# Patient Record
Sex: FEMALE | Race: OTHER | Employment: FULL TIME | URBAN - METROPOLITAN AREA
[De-identification: names, ages, dates, MRNs, and addresses within clinical notes are randomized per-mention and may not be internally consistent; named-entity substitution may affect disease eponyms.]

---

## 2020-10-05 ENCOUNTER — APPOINTMENT (OUTPATIENT)
Dept: ULTRASOUND IMAGING | Age: 30
End: 2020-10-05

## 2020-10-05 ENCOUNTER — HOSPITAL ENCOUNTER (EMERGENCY)
Age: 30
Discharge: HOME | End: 2020-10-05
Attending: EMERGENCY MEDICINE

## 2020-10-05 VITALS
HEART RATE: 72 BPM | HEIGHT: 60 IN | TEMPERATURE: 98.4 F | BODY MASS INDEX: 23.56 KG/M2 | RESPIRATION RATE: 16 BRPM | DIASTOLIC BLOOD PRESSURE: 56 MMHG | SYSTOLIC BLOOD PRESSURE: 101 MMHG | WEIGHT: 120 LBS | OXYGEN SATURATION: 99 %

## 2020-10-05 LAB
ABO/RH: NORMAL
ALBUMIN SERPL-MCNC: 4.2 G/DL (ref 3.4–5)
ALP BLD-CCNC: 70 U/L (ref 40–129)
ALT SERPL-CCNC: 11 U/L (ref 10–40)
ANION GAP SERPL CALCULATED.3IONS-SCNC: 10 MMOL/L (ref 3–16)
AST SERPL-CCNC: 18 U/L (ref 15–37)
BACTERIA: ABNORMAL /HPF
BASOPHILS ABSOLUTE: 0 K/UL (ref 0–0.2)
BASOPHILS RELATIVE PERCENT: 0.4 %
BILIRUB SERPL-MCNC: 0.4 MG/DL (ref 0–1)
BILIRUBIN DIRECT: <0.2 MG/DL (ref 0–0.3)
BILIRUBIN URINE: ABNORMAL
BILIRUBIN, INDIRECT: NORMAL MG/DL (ref 0–1)
BLOOD, URINE: ABNORMAL
BUN BLDV-MCNC: 8 MG/DL (ref 7–20)
CALCIUM SERPL-MCNC: 9.1 MG/DL (ref 8.3–10.6)
CHLORIDE BLD-SCNC: 106 MMOL/L (ref 99–110)
CLARITY: ABNORMAL
CO2: 24 MMOL/L (ref 21–32)
COLOR: ABNORMAL
CREAT SERPL-MCNC: <0.5 MG/DL (ref 0.6–1.1)
EOSINOPHILS ABSOLUTE: 0.1 K/UL (ref 0–0.6)
EOSINOPHILS RELATIVE PERCENT: 0.5 %
EPITHELIAL CELLS, UA: ABNORMAL /HPF (ref 0–5)
GFR AFRICAN AMERICAN: >60
GFR NON-AFRICAN AMERICAN: >60
GLUCOSE BLD-MCNC: 136 MG/DL (ref 70–99)
GLUCOSE URINE: NEGATIVE MG/DL
GONADOTROPIN, CHORIONIC (HCG) QUANT: 813.3 MIU/ML
HCT VFR BLD CALC: 39.7 % (ref 36–48)
HEMOGLOBIN: 13.6 G/DL (ref 12–16)
KETONES, URINE: NEGATIVE MG/DL
LEUKOCYTE ESTERASE, URINE: NEGATIVE
LIPASE: 27 U/L (ref 13–60)
LYMPHOCYTES ABSOLUTE: 1.5 K/UL (ref 1–5.1)
LYMPHOCYTES RELATIVE PERCENT: 15.4 %
MCH RBC QN AUTO: 33 PG (ref 26–34)
MCHC RBC AUTO-ENTMCNC: 34.2 G/DL (ref 31–36)
MCV RBC AUTO: 96.6 FL (ref 80–100)
MICROSCOPIC EXAMINATION: YES
MONOCYTES ABSOLUTE: 0.6 K/UL (ref 0–1.3)
MONOCYTES RELATIVE PERCENT: 5.9 %
NEUTROPHILS ABSOLUTE: 7.8 K/UL (ref 1.7–7.7)
NEUTROPHILS RELATIVE PERCENT: 77.8 %
NITRITE, URINE: NEGATIVE
PDW BLD-RTO: 13.5 % (ref 12.4–15.4)
PH UA: 5.5 (ref 5–8)
PLATELET # BLD: 245 K/UL (ref 135–450)
PMV BLD AUTO: 8.4 FL (ref 5–10.5)
POTASSIUM SERPL-SCNC: 4 MMOL/L (ref 3.5–5.1)
PROTEIN UA: 100 MG/DL
RBC # BLD: 4.11 M/UL (ref 4–5.2)
RBC UA: >100 /HPF (ref 0–4)
SODIUM BLD-SCNC: 140 MMOL/L (ref 136–145)
SPECIFIC GRAVITY UA: >=1.03 (ref 1–1.03)
TOTAL PROTEIN: 7.2 G/DL (ref 6.4–8.2)
URINE REFLEX TO CULTURE: ABNORMAL
URINE TYPE: ABNORMAL
UROBILINOGEN, URINE: 0.2 E.U./DL
WBC # BLD: 10 K/UL (ref 4–11)
WBC UA: ABNORMAL /HPF (ref 0–5)

## 2020-10-05 PROCEDURE — 6370000000 HC RX 637 (ALT 250 FOR IP): Performed by: PHYSICIAN ASSISTANT

## 2020-10-05 PROCEDURE — 86900 BLOOD TYPING SEROLOGIC ABO: CPT

## 2020-10-05 PROCEDURE — 99284 EMERGENCY DEPT VISIT MOD MDM: CPT

## 2020-10-05 PROCEDURE — 80076 HEPATIC FUNCTION PANEL: CPT

## 2020-10-05 PROCEDURE — 84702 CHORIONIC GONADOTROPIN TEST: CPT

## 2020-10-05 PROCEDURE — 76817 TRANSVAGINAL US OBSTETRIC: CPT

## 2020-10-05 PROCEDURE — 86901 BLOOD TYPING SEROLOGIC RH(D): CPT

## 2020-10-05 PROCEDURE — 83690 ASSAY OF LIPASE: CPT

## 2020-10-05 PROCEDURE — 81001 URINALYSIS AUTO W/SCOPE: CPT

## 2020-10-05 PROCEDURE — 85025 COMPLETE CBC W/AUTO DIFF WBC: CPT

## 2020-10-05 PROCEDURE — 80048 BASIC METABOLIC PNL TOTAL CA: CPT

## 2020-10-05 PROCEDURE — 2580000003 HC RX 258: Performed by: PHYSICIAN ASSISTANT

## 2020-10-05 RX ORDER — 0.9 % SODIUM CHLORIDE 0.9 %
1000 INTRAVENOUS SOLUTION INTRAVENOUS ONCE
Status: COMPLETED | OUTPATIENT
Start: 2020-10-05 | End: 2020-10-05

## 2020-10-05 RX ORDER — ACETAMINOPHEN 500 MG
1000 TABLET ORAL ONCE
Status: COMPLETED | OUTPATIENT
Start: 2020-10-05 | End: 2020-10-05

## 2020-10-05 RX ADMIN — ACETAMINOPHEN 1000 MG: 500 TABLET, FILM COATED ORAL at 19:25

## 2020-10-05 RX ADMIN — SODIUM CHLORIDE 1000 ML: 9 INJECTION, SOLUTION INTRAVENOUS at 19:25

## 2020-10-05 ASSESSMENT — PAIN SCALES - GENERAL: PAINLEVEL_OUTOF10: 8

## 2020-10-05 NOTE — ED PROVIDER NOTES
[facility]        Pt Name: Manuelito Lomeli  MRN: [id number]  [place] 1990  Date of evaluation: 10/5/2020  Provider: Carlos Verduzco PA-C  PCP: No primary care provider on file. I have seen and evaluated this patient with my supervising physician Toni Garcia. CHIEF COMPLAINT       Chief Complaint   Patient presents with    Vaginal Bleeding     began two hours ago- abd pain/ cramping as well- approx 4-5 weeks pregnant- no OB yet       HISTORY OF PRESENT ILLNESS   (Location, Timing/Onset, Context/Setting, Quality, Duration, Modifying Factors, Severity, Associated Signs and Symptoms)  Note limiting factors. Manuelito Lomeli is a 31 y.o. female that presents to the emergency department with a chief complaint of some lower abdominal and pelvic pain with some vaginal bleeding that began this morning. Her last normal menstrual cycle was on August 17. States she had a positive pregnancy test 2 weeks ago. She is G2, P1. She is yet to follow-up with OB and has not been taking prenatal vitamins. Denies any history of abdominal surgeries. Denies nausea, vomiting, fevers, chest pain, shortness of breath, dysuria, hematuria, diarrhea, bloody stool or any other symptoms. Rates her pain 8 out of 10. Language line [id number] was used to obtain history. Nursing Notes were all reviewed and agreed with or any disagreements were addressed in the HPI. REVIEW OF SYSTEMS    (2-9 systems for level 4, 10 or more for level 5)     Review of Systems    Positives and Pertinent negatives as per HPI. Except as noted above in the ROS, all other systems were reviewed and negative. PAST MEDICAL HISTORY   History reviewed. No pertinent past medical history. SURGICAL HISTORY   History reviewed. No pertinent surgical history.       CURRENTMEDICATIONS       There are no discharge medications for this patient. ALLERGIES     Patient has no known allergies. FAMILYHISTORY     History reviewed. No pertinent family history. SOCIAL HISTORY       Social History     Tobacco Use    Smoking status: Never Smoker    Smokeless tobacco: Never Used   Substance Use Topics    Alcohol use: Not Currently    Drug use: Not Currently       SCREENINGS             PHYSICAL EXAM    (up to 7 for level 4, 8 or more for level 5)     ED Triage Vitals [10/05/20 1522]   BP Temp Temp Source Pulse Resp SpO2 Height Weight   102/61 98.4 °F (36.9 °C) Oral 72 16 98 % 5' (1.524 m) 120 lb (54.4 kg)       Physical Exam  Vitals signs and nursing note reviewed. Constitutional:       Appearance: She is well-developed. She is not diaphoretic. HENT:      Head: Atraumatic. Nose: Nose normal.   Eyes:      General:         Right eye: No discharge. Left eye: No discharge. Neck:      Musculoskeletal: Normal range of motion. Cardiovascular:      Rate and Rhythm: Normal rate and regular rhythm. Heart sounds: No murmur. No friction rub. No gallop. Pulmonary:      Effort: Pulmonary effort is normal. No respiratory distress. Breath sounds: No stridor. No wheezing, rhonchi or rales. Abdominal:      General: Bowel sounds are normal. There is no distension. Palpations: Abdomen is soft. There is no mass. Tenderness: There is abdominal tenderness. There is no guarding or rebound. Hernia: No hernia is present. Comments: Generalized subjective tenderness to palpate throughout the lower abdomen without guarding, rebound or rigidity   Musculoskeletal: Normal range of motion. General: No swelling. Skin:     General: Skin is warm and dry. Findings: No erythema or rash. Neurological:      Mental Status: She is alert and oriented to person, place, and time. Cranial Nerves: No cranial nerve deficit.    Psychiatric:         Behavior: Behavior normal.         DIAGNOSTIC RESULTS LABS:    Labs Reviewed   BASIC METABOLIC PANEL - Abnormal; Notable for the following components:       Result Value    Glucose 136 (*)     CREATININE <0.5 (*)     All other components within normal limits    Narrative:     Performed at:  [facility] [address] [facility] [place],  [place], [address]   Phone [phone]   CBC WITH AUTO DIFFERENTIAL - Abnormal; Notable for the following components:    Neutrophils Absolute 7.8 (*)     All other components within normal limits    Narrative:     Performed at:  [facility] [address]  [place] [address] [address]   Phone [phone]   URINE RT REFLEX TO CULTURE - Abnormal; Notable for the following components:    Color, UA RED (*)     Clarity, UA CLOUDY (*)     Bilirubin Urine SMALL (*)     Blood, Urine LARGE (*)     Protein,  (*)     All other components within normal limits    Narrative:     Performed at:  [facility] [address] [place],  [place] [address] [address]   Phone [phone]   MICROSCOPIC URINALYSIS - Abnormal; Notable for the following components:    RBC, UA >100 (*)     Epithelial Cells, UA 6-10 (*)     Bacteria, UA Rare (*)     All other components within normal limits    Narrative:     Performed at:  [facility] [address] [facility] [address]  [place] [address] [address]   Phone [phone]   HCG, QUANTITATIVE, PREGNANCY    Narrative:     Performed at:  [facility] [address]  [place], [address]   Phone [phone]   LIPASE    Narrative:     Performed at:  [facility] [address] [facility] [address]  [place], [place]   Phone [phone]   HEPATIC FUNCTION PANEL    Narrative:     Performed at:  [facility] [address] [place], [place]   Phone [phone]   ABO/RH    Narrative:     Performed at:  [facility]  [address] [place] [address]   Phone [phone]       All other labs were within normal range or not returned as of this dictation. EKG: All EKG's are interpreted by the Emergency Department Physician in the absence of a cardiologist.  Please see their note for interpretation of EKG. RADIOLOGY:   Non-plain film images such as CT, Ultrasound and MRI are read by the radiologist. Plain radiographic images are visualized and preliminarily interpreted by the ED Provider with the below findings:        Interpretation per the Radiologist below, if available at the time of this note:    US OB TRANSVAGINAL   Preliminary Result   1. Indeterminate 2.1 cm cystic area adjacent to left ovary. No discrete   fetal pole or yolk sac are identified. Given no definitive IUP identified,   an ectopic pregnancy cannot be excluded. 2. 0.2 cm hypoechoic area within the lower uterine segment which could   reflect an early gestational sac or pseudo gestational sac. No fetal pole or   yolk sac are identified within the uterus. Recommend close clinical   follow-up and correlation with serial beta HCGs. 3. Otherwise normal-appearing ovaries. Critical results were called by Dr. Jaun Ahumada. Iesha Morataya MD to Nasir Reyna   on 10/5/2020 at 22:44.                  PROCEDURES   Unless otherwise noted below, none     Procedures    CRITICAL CARE TIME   N/A    CONSULTS: Dr. Joshua Estrada and DIFFERENTIAL DIAGNOSIS/MDM:   Vitals:    Vitals:    10/05/20 1522 10/05/20 1920   BP: 102/61 (!) 101/56   Pulse: 72    Resp: 16    Temp: 98.4 °F (36.9 °C)    TempSrc: Oral    SpO2: 98% 99%   Weight: 120 lb (54.4 kg)    Height: 5' (1.524 m)        Patient was given the following medications:  Medications   0.9 % sodium chloride bolus (0 mLs Intravenous Stopped 10/5/20 2318)   acetaminophen (TYLENOL) tablet 1,000 mg (1,000 mg Oral Given 10/5/20 1925)           Patient presented with some vaginal bleeding and lower abdominal pain. Recheck the patient she still has a benign abdominal exam just with some subjective tenderness to palpate her lower abdomen slightly worse in the suprapubic and left lower quadrant. Ultrasound does reveal a cystic mass by the left ovary but does not have a fetal pole. We did discuss this with OB who evaluated the patient in the emergency department and believes she most likely is miscarrying. We will follow-up with OB clinic tomorrow. Was provided this number and information. Understood her strict return precautions to return here for any worsening symptoms, problems at home, increased bleeding, dizziness or near syncope. We discussed this with her one  line. Low suspicion for pyelonephritis, tubo-ovarian abscess, ovarian torsion, diverticulitis, obstruction or other emergent etiology. Patient was stable at time of discharge. FINAL IMPRESSION      1. Vaginal bleeding in pregnancy          DISPOSITION/PLAN   DISPOSITION Decision To Discharge 10/05/2020 11:11:17 PM      PATIENT REFERREDTO:  Follow-up in [facility] clinic tomorrow at [address]          DISCHARGE MEDICATIONS:  There are no discharge medications for this patient. DISCONTINUED MEDICATIONS:  There are no discharge medications for this patient.              (Please note that portions of this note were completed with a voice recognition program.  Efforts were made to edit the dictations but occasionally words are mis-transcribed.)    Mali Jones PA-C (electronically signed)            Mali Jones PA-C  10/06/20 0020

## 2020-10-05 NOTE — LETTER
[facility] Emergency Department  [address] [place], [address]             October 5, 2020    Patient: Maxx Cuevas   YOB: 1990   Date of Visit: 10/5/2020       To Whom It May Concern:    Elidia Lacy was seen and treated in our emergency department on 10/5/2020. She ,may return to work on 10/08/20.       Sincerely,         [facility] Emergency Department

## 2020-10-06 NOTE — ED PROVIDER NOTES
As physician-in-triage, I performed a medical screening history and physical exam on [phone]. I also cared for and evaluated the patient in conjunction with the ED Advanced Practice Provider. All diagnostic, treatment, and disposition decisions were made by myself in conjunction with the advanced practice provider. For all further details of the patient's emergency department visit, please see the advanced practice provider's documentation. G2, P1 female, with intermittent vaginal bleeding abdominal pain bilateral left greater than right, mild tenderness without severe rebound, no intrauterine pregnancy was noted beta-hCG is 13, Rh+, consultation with OB was performed, who is examined the patient in the emergency department. Clinical concern for the possibility of a left ectopic without visualized fetal pole sac or severe free fluid. The patient was seen by Dr. Kolby Duggan [facility], feels that she had a miscarriage and is not reflective of ectopic pregnancy. She will have a repeat beta-hCG outpatient she is return if she has any sudden onset of severe symptoms.       impression: Threatened miscarriage, pregnancy of unknown location          Sujatha Wooten MD  75/83/64 [id number]       Sujatha Wooten MD  01/60/82 9312

## 2020-10-06 NOTE — ED NOTES
Pt Discharged in stable condition, VSS, no signs of distress, discharge instructions and meds reviewed. Pt verbalizes understanding and states no further questions or concerns unaddressed.        Teto Ron RN  10/05/20 6821

## 2020-10-06 NOTE — H&P
Department of Gynecology  Consult Note      Reason for Consult:  Vaginal bleeding, apin  Requesting Physician:  ER    CHIEF COMPLAINT:   Vaginal bleeding, low abdominal cramping    History obtained from patient ( language line used)    HISTORY OF PRESENT ILLNESS:     The patient is a 31 y.o. Lori Raw at 7 weeks with vaginla bleeding and low abdominal cramping that started today. Pt states around 3 pm she start having severe low abdominal cramping(\"felt like labor\") and passed some blood clots. Since then she feels better, minimal pain. Past Medical History:    History reviewed. No pertinent past medical history. Past Surgical History:    History reviewed. No pertinent surgical history. Past Gynecological History:    Last menstrual period:  8/17/20    meds:No current facility-administered medications for this encounter. No current outpatient medications on file. Allergies:  Patient has no known allergies. Social History:  TOBACCO:   reports that she has never smoked. She has never used smokeless tobacco.    Family History:   History reviewed. No pertinent family history.      PHYSICAL EXAM:    Vitals:  BP (!) 101/56   Pulse 72   Temp 98.4 °F (36.9 °C) (Oral)   Resp 16   Ht 5' (1.524 m)   Wt 120 lb (54.4 kg)   LMP 08/17/2020   SpO2 99%   BMI 23.44 kg/m²     CONSTITUTIONAL:  awake, alert, cooperative, no apparent distress, and appears stated age  LUNGS:  No increased work of breathing, good air exchange, clear to auscultation bilaterally, no crackles or wheezing  CARDIOVASCULAR:  Normal apical impulse, regular rate and rhythm, normal S1 and S2, no S3 or S4, and no murmur noted  ABDOMEN:  No scars, normal bowel sounds, soft, non-distended, non-tender, no masses palpated, no hepatosplenomegally  GENITAL/URINARY:  External Genitalia:  General appearance; normal, Hair distribution; normal, Lesions absent  Mild amount of blood and tissue in vagina  Bimanual exam: uterus slightly tender  Adnexa: non tender on right, mild tenderness on the left  DATA:  Recent Labs     10/05/20  1534   WBC 10.0   HGB 13.6   HCT 39.7        Recent Labs     10/05/20  1534      K 4.0      CO2 24   BUN 8   CREATININE <0.5*   CALCIUM 9.1   AST 18   ALT 11     Quant B-HCG- 813  Labs:    CBC:   Lab Results   Component Value Date    WBC 10.0 10/05/2020    RBC 4.11 10/05/2020    HGB 13.6 10/05/2020    HCT 39.7 10/05/2020    MCV 96.6 10/05/2020    RDW 13.5 10/05/2020     10/05/2020     Pelvic US:  1. Indeterminate 2.1 cm cystic area adjacent to left ovary.  No discrete    fetal pole or yolk sac are identified.  Given no definitive IUP identified,    an ectopic pregnancy cannot be excluded. 2. 0.2 cm hypoechoic area within the lower uterine segment which could    reflect an early gestational sac or pseudo gestational sac.  No fetal pole or    yolk sac are identified within the uterus.  Recommend close clinical    follow-up and correlation with serial beta HCGs. 3. Otherwise normal-appearing ovaries. IMPRESSION/RECOMMENDATIONS:      32 yo  at 7 weeks  1. Vaginal bleeding, pain- most likely SAB. Will follow up with serial Quant. B-HCG at the clinic this week.   2.Rh positive

## 2020-10-09 ENCOUNTER — HOSPITAL ENCOUNTER (OUTPATIENT)
Age: 30
Discharge: HOME | End: 2020-10-09

## 2020-10-09 ENCOUNTER — ROUTINE PRENATAL (OUTPATIENT)
Dept: OBGYN | Age: 30
End: 2020-10-09

## 2020-10-09 VITALS — SYSTOLIC BLOOD PRESSURE: 103 MMHG | RESPIRATION RATE: 18 BRPM | TEMPERATURE: 98.4 F | DIASTOLIC BLOOD PRESSURE: 60 MMHG

## 2020-10-09 PROBLEM — Z87.59 H/O ONE MISCARRIAGE: Status: ACTIVE | Noted: 2020-10-09

## 2020-10-09 PROBLEM — Z78.9 LANGUAGE BARRIER: Status: ACTIVE | Noted: 2020-10-09

## 2020-10-09 LAB — GONADOTROPIN, CHORIONIC (HCG) QUANT: 144.7 MIU/ML

## 2020-10-09 PROCEDURE — 99212 OFFICE O/P EST SF 10 MIN: CPT | Performed by: OBSTETRICS & GYNECOLOGY

## 2020-10-09 PROCEDURE — 84702 CHORIONIC GONADOTROPIN TEST: CPT

## 2020-10-09 PROCEDURE — 36415 COLL VENOUS BLD VENIPUNCTURE: CPT

## 2020-10-09 RX ORDER — VITAMIN A, VITAMIN C, VITAMIN D-3, VITAMIN E, VITAMIN B-1, VITAMIN B-2, NIACIN, VITAMIN B-6, CALCIUM, IRON, ZINC, COPPER 4000; 120; 400; 22; 1.84; 3; 20; 10; 1; 12; 200; 27; 25; 2 [IU]/1; MG/1; [IU]/1; MG/1; MG/1; MG/1; MG/1; MG/1; MG/1; UG/1; MG/1; MG/1; MG/1; MG/1
1 TABLET ORAL DAILY
Qty: 30 TABLET | Refills: 11 | Status: SHIPPED | OUTPATIENT
Start: 2020-10-09

## 2020-10-09 NOTE — PROGRESS NOTES
ER f/up for threatened AB. History of heavy bleeding with clots & cramping started on 10/5/20 x 3 days. States light bleeding today without clots & no cramping/pain.

## 2020-10-09 NOTE — PROGRESS NOTES
Patient with no further bleeding HCG has dropped appropriately. No cramping or pain. Desires pregnancy. Will recheck HCG in one week and follow to zero.      CV RRR  Pulm CTA  ABD soft nontender

## 2021-01-29 ENCOUNTER — INITIAL PRENATAL (OUTPATIENT)
Dept: OBGYN | Age: 31
End: 2021-01-29
Payer: MEDICAID

## 2021-01-29 ENCOUNTER — HOSPITAL ENCOUNTER (OUTPATIENT)
Age: 31
Discharge: HOME | End: 2021-01-29
Payer: MEDICAID

## 2021-01-29 VITALS
HEART RATE: 69 BPM | DIASTOLIC BLOOD PRESSURE: 65 MMHG | SYSTOLIC BLOOD PRESSURE: 104 MMHG | WEIGHT: 126 LBS | BODY MASS INDEX: 24.61 KG/M2

## 2021-01-29 DIAGNOSIS — Z34.82 PRENATAL CARE, SUBSEQUENT PREGNANCY, SECOND TRIMESTER: Primary | ICD-10-CM

## 2021-01-29 DIAGNOSIS — Z34.92 PRENATAL CARE IN SECOND TRIMESTER: ICD-10-CM

## 2021-01-29 PROBLEM — Z78.9 LANGUAGE BARRIER: Status: RESOLVED | Noted: 2020-10-09 | Resolved: 2021-01-29

## 2021-01-29 LAB
ABO/RH: NORMAL
ANTIBODY SCREEN: NORMAL
BILIRUBIN URINE: NEGATIVE MG/DL
BLOOD, URINE: NEGATIVE
CLARITY: CLEAR
COLOR: YELLOW
GLUCOSE URINE: NEGATIVE MG/DL
HEPATITIS C ANTIBODY INTERPRETATION: NORMAL
KETONES, URINE: NEGATIVE MG/DL
LEUKOCYTE ESTERASE, URINE: NEGATIVE
NITRITE, URINE: NEGATIVE
PH UA: >=9 (ref 5–8)
PROTEIN UA: 100 MG/DL
SPECIFIC GRAVITY UA: 1.02 (ref 1–1.03)
UROBILINOGEN, URINE: 0.2 E.U./DL

## 2021-01-29 PROCEDURE — 87086 URINE CULTURE/COLONY COUNT: CPT

## 2021-01-29 PROCEDURE — 86702 HIV-2 ANTIBODY: CPT

## 2021-01-29 PROCEDURE — 86850 RBC ANTIBODY SCREEN: CPT

## 2021-01-29 PROCEDURE — 99204 OFFICE O/P NEW MOD 45 MIN: CPT | Performed by: OBSTETRICS & GYNECOLOGY

## 2021-01-29 PROCEDURE — 86900 BLOOD TYPING SEROLOGIC ABO: CPT

## 2021-01-29 PROCEDURE — 86901 BLOOD TYPING SEROLOGIC RH(D): CPT

## 2021-01-29 PROCEDURE — 88175 CYTOPATH C/V AUTO FLUID REDO: CPT

## 2021-01-29 PROCEDURE — 36415 COLL VENOUS BLD VENIPUNCTURE: CPT

## 2021-01-29 PROCEDURE — 86787 VARICELLA-ZOSTER ANTIBODY: CPT

## 2021-01-29 PROCEDURE — 86780 TREPONEMA PALLIDUM: CPT

## 2021-01-29 PROCEDURE — 81003 URINALYSIS AUTO W/O SCOPE: CPT

## 2021-01-29 PROCEDURE — 87340 HEPATITIS B SURFACE AG IA: CPT

## 2021-01-29 PROCEDURE — 87591 N.GONORRHOEAE DNA AMP PROB: CPT

## 2021-01-29 PROCEDURE — 85025 COMPLETE CBC W/AUTO DIFF WBC: CPT

## 2021-01-29 PROCEDURE — 86701 HIV-1ANTIBODY: CPT

## 2021-01-29 PROCEDURE — 87491 CHLMYD TRACH DNA AMP PROBE: CPT

## 2021-01-29 PROCEDURE — 86803 HEPATITIS C AB TEST: CPT

## 2021-01-29 PROCEDURE — 86592 SYPHILIS TEST NON-TREP QUAL: CPT

## 2021-01-29 PROCEDURE — 86762 RUBELLA ANTIBODY: CPT

## 2021-01-29 PROCEDURE — 87390 HIV-1 AG IA: CPT

## 2021-01-29 RX ORDER — VITAMIN A, VITAMIN C, VITAMIN D-3, VITAMIN E, VITAMIN B-1, VITAMIN B-2, NIACIN, VITAMIN B-6, CALCIUM, IRON, ZINC, COPPER 4000; 120; 400; 22; 1.84; 3; 20; 10; 1; 12; 200; 27; 25; 2 [IU]/1; MG/1; [IU]/1; MG/1; MG/1; MG/1; MG/1; MG/1; MG/1; UG/1; MG/1; MG/1; MG/1; MG/1
1 TABLET ORAL DAILY
Qty: 30 TABLET | Refills: 11 | Status: SHIPPED | OUTPATIENT
Start: 2021-01-29 | End: 2021-03-25

## 2021-01-29 NOTE — PROGRESS NOTES
Initial prenatal. Pt. States she was told she had a miscarriage in Oct 2020, went to [facility] for F/U and they told her she was pregnant, ordered usd at [facility]was done 12/4/2020 showed 8 week IUP. Discussed AFP,CF screening; Pt Declined testing. Patient denies falls in past 30 days. Denies varicella immunity, denies cats in home, discussed cat precautions, no recent travel, consents to blood transfusion if needed. Denies bleeding, leaking of fluid or ctx. Declined Flu vaccine.

## 2021-01-29 NOTE — PROGRESS NOTES
[facility] Obstetric Social History (Vietnamese)    Patient Name Esther Ma [facility]  7-16-21  Prenatal Care Began  1-29-21    Phone [phone] (home)  [facility]     Emergency Contact: Esdras Anthony    Phone [phone]    Marital Status: Single x                 Living Situation:  Lives with her child     How many children do you have? 1    Name  Esther Ma  Age 3-  Name   Age      Attitude about pregnancy:  Happy     Preparation for Infant arrival:  Will purchase items     Any Domestic Abuse:  Denies   Physical Abuse:  Denies   Sexual Abuse:  Denies   Substance Abuse:  Denies   History of Depression:  Denies   Other Mental Health Issues:  Denies     Education:  Unemployed  Occupation:  [employer] [place]   Medicaid    Support System: boyfriend, friend    Father of Baby:  Moses  Age: 52 Education:    Occupation:  construction  Emotional Support:  good Financial Support:  Good   Any other children? 1   Attitude toward Pregnancy? Very happy   Relationship with Father of Baby: together 2 1/2 years    Patient concerns/plans for self and infant:  No concerns, plan looking for a job    Summary:  Pt scored a 5 on depression scale.   Psychological Screen no additional concerns    Referrals Offered:  [facility] information only   Follow-up needed:      : MARY WARD  Date: 1/29/2021     Follow-up:

## 2021-01-30 LAB
BASOPHILS ABSOLUTE: 0 K/UL (ref 0–0.2)
BASOPHILS RELATIVE PERCENT: 0.5 %
EOSINOPHILS ABSOLUTE: 0.1 K/UL (ref 0–0.6)
EOSINOPHILS RELATIVE PERCENT: 1 %
HCT VFR BLD CALC: 36.9 % (ref 36–48)
HEMOGLOBIN: 12.5 G/DL (ref 12–16)
HEPATITIS B SURFACE ANTIGEN INTERPRETATION: ABNORMAL
HIV AG/AB: NORMAL
HIV ANTIGEN: NORMAL
HIV-1 ANTIBODY: NORMAL
HIV-2 AB: NORMAL
LYMPHOCYTES ABSOLUTE: 1.5 K/UL (ref 1–5.1)
LYMPHOCYTES RELATIVE PERCENT: 22.2 %
MCH RBC QN AUTO: 32.1 PG (ref 26–34)
MCHC RBC AUTO-ENTMCNC: 33.9 G/DL (ref 31–36)
MCV RBC AUTO: 94.8 FL (ref 80–100)
MONOCYTES ABSOLUTE: 0.5 K/UL (ref 0–1.3)
MONOCYTES RELATIVE PERCENT: 7 %
NEUTROPHILS ABSOLUTE: 4.5 K/UL (ref 1.7–7.7)
NEUTROPHILS RELATIVE PERCENT: 69.3 %
PDW BLD-RTO: 12.9 % (ref 12.4–15.4)
PLATELET # BLD: 222 K/UL (ref 135–450)
PMV BLD AUTO: 9.4 FL (ref 5–10.5)
RBC # BLD: 3.89 M/UL (ref 4–5.2)
RUBELLA ANTIBODY IGG: 56.4 IU/ML
TOTAL SYPHILLIS IGG/IGM: ABNORMAL
URINE CULTURE, ROUTINE: NORMAL
WBC # BLD: 6.6 K/UL (ref 4–11)

## 2021-02-02 LAB
C. TRACHOMATIS DNA,THIN PREP: NEGATIVE
N. GONORRHOEAE DNA, THIN PREP: NEGATIVE

## 2021-02-03 LAB — VARICELLA-ZOSTER VIRUS AB, IGG: NORMAL

## 2021-02-11 ENCOUNTER — TELEPHONE (OUTPATIENT)
Dept: OBGYN | Age: 31
End: 2021-02-11

## 2021-02-11 NOTE — TELEPHONE ENCOUNTER
Patient called with questions concerning fetal movement. Clarified concerns and instructed patient to keep regularly scheduled appointment. To seek care in the ED or triage with decreased fetal movement, vaginal bleeding or abdominal pain. Patient verbalized understanding through .

## 2021-02-25 ENCOUNTER — ROUTINE PRENATAL (OUTPATIENT)
Dept: OBGYN | Age: 31
End: 2021-02-25
Payer: MEDICAID

## 2021-02-25 ENCOUNTER — HOSPITAL ENCOUNTER (OUTPATIENT)
Dept: ULTRASOUND IMAGING | Age: 31
Discharge: HOME | End: 2021-02-25
Payer: MEDICAID

## 2021-02-25 VITALS
SYSTOLIC BLOOD PRESSURE: 90 MMHG | DIASTOLIC BLOOD PRESSURE: 58 MMHG | WEIGHT: 126 LBS | HEART RATE: 72 BPM | BODY MASS INDEX: 24.61 KG/M2

## 2021-02-25 DIAGNOSIS — Z34.92 PRENATAL CARE IN SECOND TRIMESTER: Primary | ICD-10-CM

## 2021-02-25 DIAGNOSIS — Z3A.20 20 WEEKS GESTATION OF PREGNANCY: ICD-10-CM

## 2021-02-25 LAB
BILIRUBIN URINE: NEGATIVE MG/DL
BLOOD, URINE: ABNORMAL
CLARITY: CLEAR
COLOR: YELLOW
GLUCOSE URINE: NEGATIVE MG/DL
KETONES, URINE: NEGATIVE MG/DL
LEUKOCYTE ESTERASE, URINE: NEGATIVE
NITRITE, URINE: NEGATIVE
PH UA: 8.5 (ref 5–8)
PROTEIN UA: NEGATIVE MG/DL
SPECIFIC GRAVITY UA: >=1.03 (ref 1–1.03)
UROBILINOGEN, URINE: 0.2 E.U./DL

## 2021-02-25 PROCEDURE — 99212 OFFICE O/P EST SF 10 MIN: CPT | Performed by: OBSTETRICS & GYNECOLOGY

## 2021-02-25 PROCEDURE — 76805 OB US >/= 14 WKS SNGL FETUS: CPT

## 2021-02-25 PROCEDURE — 81003 URINALYSIS AUTO W/O SCOPE: CPT

## 2021-03-25 ENCOUNTER — ROUTINE PRENATAL (OUTPATIENT)
Dept: OBGYN | Age: 31
End: 2021-03-25
Payer: COMMERCIAL

## 2021-03-25 VITALS
DIASTOLIC BLOOD PRESSURE: 58 MMHG | HEART RATE: 75 BPM | WEIGHT: 127.8 LBS | BODY MASS INDEX: 24.96 KG/M2 | SYSTOLIC BLOOD PRESSURE: 105 MMHG

## 2021-03-25 DIAGNOSIS — Z34.82 PRENATAL CARE, SUBSEQUENT PREGNANCY, SECOND TRIMESTER: Primary | ICD-10-CM

## 2021-03-25 DIAGNOSIS — O22.02 VARICOSE VEINS OF LOWER EXTREMITY DURING PREGNANCY IN SECOND TRIMESTER: ICD-10-CM

## 2021-03-25 LAB
BILIRUBIN URINE: ABNORMAL MG/DL
BLOOD, URINE: NEGATIVE
CLARITY: CLEAR
COLOR: YELLOW
GLUCOSE URINE: NEGATIVE MG/DL
KETONES, URINE: NEGATIVE MG/DL
LEUKOCYTE ESTERASE, URINE: NEGATIVE
NITRITE, URINE: NEGATIVE
PH UA: 6 (ref 5–8)
PROTEIN UA: ABNORMAL MG/DL
SPECIFIC GRAVITY UA: 1.02 (ref 1–1.03)
UROBILINOGEN, URINE: 1 E.U./DL

## 2021-03-25 PROCEDURE — 99212 OFFICE O/P EST SF 10 MIN: CPT | Performed by: OBSTETRICS & GYNECOLOGY

## 2021-03-25 PROCEDURE — 81003 URINALYSIS AUTO W/O SCOPE: CPT

## 2021-03-25 NOTE — PROGRESS NOTES
Pt with c/o today of  Occasional vaginal discharge of watery liquid started last week and occasional ctx;denies vaginal bleeding; also missing work due to pain related to varicose veins. states baby is active.

## 2021-04-22 ENCOUNTER — ROUTINE PRENATAL (OUTPATIENT)
Dept: OBGYN | Age: 31
End: 2021-04-22
Payer: COMMERCIAL

## 2021-04-22 ENCOUNTER — HOSPITAL ENCOUNTER (OUTPATIENT)
Age: 31
Discharge: HOME | End: 2021-04-22
Payer: COMMERCIAL

## 2021-04-22 VITALS
HEART RATE: 76 BPM | BODY MASS INDEX: 25.35 KG/M2 | WEIGHT: 129.8 LBS | SYSTOLIC BLOOD PRESSURE: 88 MMHG | DIASTOLIC BLOOD PRESSURE: 57 MMHG

## 2021-04-22 DIAGNOSIS — Z34.82 PRENATAL CARE, SUBSEQUENT PREGNANCY, SECOND TRIMESTER: Primary | ICD-10-CM

## 2021-04-22 LAB
BILIRUBIN URINE: NEGATIVE MG/DL
BLOOD, URINE: NEGATIVE
CLARITY: CLEAR
COLOR: YELLOW
GLUCOSE CHALLENGE: 107 MG/DL
GLUCOSE URINE: NEGATIVE MG/DL
HCT VFR BLD CALC: 33.8 % (ref 36–48)
HEMOGLOBIN: 11.4 G/DL (ref 12–16)
KETONES, URINE: NEGATIVE MG/DL
LEUKOCYTE ESTERASE, URINE: ABNORMAL
MCH RBC QN AUTO: 32.8 PG (ref 26–34)
MCHC RBC AUTO-ENTMCNC: 33.7 G/DL (ref 31–36)
MCV RBC AUTO: 97.4 FL (ref 80–100)
NITRITE, URINE: NEGATIVE
PDW BLD-RTO: 13.7 % (ref 12.4–15.4)
PH UA: 6.5 (ref 5–8)
PLATELET # BLD: 224 K/UL (ref 135–450)
PMV BLD AUTO: 9.5 FL (ref 5–10.5)
PROTEIN UA: NEGATIVE MG/DL
RBC # BLD: 3.46 M/UL (ref 4–5.2)
SPECIFIC GRAVITY UA: 1.02 (ref 1–1.03)
UROBILINOGEN, URINE: 1 E.U./DL
WBC # BLD: 5.2 K/UL (ref 4–11)

## 2021-04-22 PROCEDURE — 81003 URINALYSIS AUTO W/O SCOPE: CPT

## 2021-04-22 PROCEDURE — 82950 GLUCOSE TEST: CPT

## 2021-04-22 PROCEDURE — 36415 COLL VENOUS BLD VENIPUNCTURE: CPT

## 2021-04-22 PROCEDURE — 85027 COMPLETE CBC AUTOMATED: CPT

## 2021-04-22 NOTE — PROGRESS NOTES
C/o intermittent pelvic and low back discomfort. PTL precautions given. Tdap handout given, will decided by next visit. Reports + fetal movement, denies bleeding, leaking of fluid. Glucola for 1hr GCT given and testing explained.  Pt denies any questions and states understanding of test.

## 2021-04-22 NOTE — PROGRESS NOTES
Social Service Note:   Patient completed depression scale. Patient scored a 0 and is not showing signs of depression. Patient didn't have any other questions or needs at this time.   Pt has [address] [place] [address]

## 2021-05-06 ENCOUNTER — ROUTINE PRENATAL (OUTPATIENT)
Dept: OBGYN | Age: 31
End: 2021-05-06
Payer: COMMERCIAL

## 2021-05-06 VITALS
DIASTOLIC BLOOD PRESSURE: 55 MMHG | WEIGHT: 130 LBS | BODY MASS INDEX: 25.39 KG/M2 | HEART RATE: 83 BPM | SYSTOLIC BLOOD PRESSURE: 93 MMHG

## 2021-05-06 DIAGNOSIS — Z34.93 PRENATAL CARE IN THIRD TRIMESTER: Primary | ICD-10-CM

## 2021-05-06 LAB
BILIRUBIN URINE: ABNORMAL MG/DL
BLOOD, URINE: NEGATIVE
CLARITY: CLEAR
COLOR: YELLOW
GLUCOSE URINE: NEGATIVE MG/DL
KETONES, URINE: ABNORMAL MG/DL
LEUKOCYTE ESTERASE, URINE: ABNORMAL
NITRITE, URINE: NEGATIVE
PH UA: 5.5 (ref 5–8)
PROTEIN UA: 30 MG/DL
SPECIFIC GRAVITY UA: 1.02 (ref 1–1.03)
UROBILINOGEN, URINE: 2 E.U./DL

## 2021-05-06 PROCEDURE — 81003 URINALYSIS AUTO W/O SCOPE: CPT

## 2021-05-06 PROCEDURE — 6360000002 HC RX W HCPCS: Performed by: OBSTETRICS & GYNECOLOGY

## 2021-05-06 PROCEDURE — 99212 OFFICE O/P EST SF 10 MIN: CPT | Performed by: OBSTETRICS & GYNECOLOGY

## 2021-05-06 PROCEDURE — 90715 TDAP VACCINE 7 YRS/> IM: CPT | Performed by: OBSTETRICS & GYNECOLOGY

## 2021-05-06 PROCEDURE — 90471 IMMUNIZATION ADMIN: CPT | Performed by: OBSTETRICS & GYNECOLOGY

## 2021-05-06 RX ADMIN — TETANUS TOXOID, REDUCED DIPHTHERIA TOXOID AND ACELLULAR PERTUSSIS VACCINE, ADSORBED 0.5 ML: 5; 2.5; 8; 8; 2.5 SUSPENSION INTRAMUSCULAR at 09:19

## 2021-05-06 NOTE — PROGRESS NOTES
Pt with c/o today of daily HA and feels that the PNV is causing her to have to urinate frequently; Concerned about feeling tachycardia since her GCT; states baby is active and meeting daily kick counts. Tdap vaccine information sheet given and explained; pt states understanding; consent form signed and vaccine given.

## 2021-05-18 ENCOUNTER — ROUTINE PRENATAL (OUTPATIENT)
Dept: OBGYN | Age: 31
End: 2021-05-18
Payer: COMMERCIAL

## 2021-05-18 VITALS
DIASTOLIC BLOOD PRESSURE: 61 MMHG | HEART RATE: 78 BPM | BODY MASS INDEX: 25.78 KG/M2 | WEIGHT: 132 LBS | SYSTOLIC BLOOD PRESSURE: 100 MMHG

## 2021-05-18 DIAGNOSIS — Z34.93 PRENATAL CARE IN THIRD TRIMESTER: Primary | ICD-10-CM

## 2021-05-18 LAB
BILIRUBIN URINE: NEGATIVE MG/DL
BLOOD, URINE: NEGATIVE
CLARITY: CLEAR
COLOR: YELLOW
GLUCOSE URINE: NEGATIVE MG/DL
KETONES, URINE: NEGATIVE MG/DL
LEUKOCYTE ESTERASE, URINE: NEGATIVE
NITRITE, URINE: NEGATIVE
PH UA: 7 (ref 5–8)
PROTEIN UA: NEGATIVE MG/DL
SPECIFIC GRAVITY UA: 1.01 (ref 1–1.03)
UROBILINOGEN, URINE: 1 E.U./DL

## 2021-05-18 PROCEDURE — 81003 URINALYSIS AUTO W/O SCOPE: CPT

## 2021-05-18 PROCEDURE — 99212 OFFICE O/P EST SF 10 MIN: CPT | Performed by: OBSTETRICS & GYNECOLOGY

## 2021-05-18 NOTE — PROGRESS NOTES
Pt. Harvinder Noriega in to clinic today. She was scheduled for regular appointment this Thursday. Pt. Came 2 days early bc she started having increased swelling and pain in her RLE since yesterday and frequent ctx last ginger, thinks she is due to deliver soon. Wearing support hose on RLE Reports + fetal movement,advised to do daily BOO, denies bleeding, leaking of fluid. Labor precautions reviewed.

## 2021-06-03 ENCOUNTER — ROUTINE PRENATAL (OUTPATIENT)
Dept: OBGYN | Age: 31
End: 2021-06-03
Payer: COMMERCIAL

## 2021-06-03 VITALS
HEART RATE: 76 BPM | SYSTOLIC BLOOD PRESSURE: 105 MMHG | BODY MASS INDEX: 25.97 KG/M2 | DIASTOLIC BLOOD PRESSURE: 60 MMHG | WEIGHT: 133 LBS

## 2021-06-03 DIAGNOSIS — Z34.93 PRENATAL CARE IN THIRD TRIMESTER: Primary | ICD-10-CM

## 2021-06-03 LAB
BILIRUBIN URINE: NEGATIVE MG/DL
BLOOD, URINE: NEGATIVE
CLARITY: CLEAR
COLOR: YELLOW
GLUCOSE URINE: NEGATIVE MG/DL
KETONES, URINE: NEGATIVE MG/DL
LEUKOCYTE ESTERASE, URINE: ABNORMAL
NITRITE, URINE: NEGATIVE
PH UA: 7 (ref 5–8)
PROTEIN UA: NEGATIVE MG/DL
SPECIFIC GRAVITY UA: 1.01 (ref 1–1.03)
UROBILINOGEN, URINE: 1 E.U./DL

## 2021-06-03 PROCEDURE — 81003 URINALYSIS AUTO W/O SCOPE: CPT

## 2021-06-03 PROCEDURE — 99212 OFFICE O/P EST SF 10 MIN: CPT | Performed by: OBSTETRICS & GYNECOLOGY

## 2021-06-08 ENCOUNTER — ROUTINE PRENATAL (OUTPATIENT)
Dept: OBGYN | Age: 31
End: 2021-06-08
Payer: COMMERCIAL

## 2021-06-08 VITALS
SYSTOLIC BLOOD PRESSURE: 94 MMHG | HEART RATE: 78 BPM | BODY MASS INDEX: 25.88 KG/M2 | WEIGHT: 132.5 LBS | DIASTOLIC BLOOD PRESSURE: 58 MMHG

## 2021-06-08 DIAGNOSIS — K83.1 CHOLESTASIS DURING PREGNANCY IN THIRD TRIMESTER: ICD-10-CM

## 2021-06-08 DIAGNOSIS — O26.613 CHOLESTASIS DURING PREGNANCY IN THIRD TRIMESTER: ICD-10-CM

## 2021-06-08 DIAGNOSIS — L29.9 PRURITUS: Primary | ICD-10-CM

## 2021-06-08 LAB
BILIRUBIN URINE: ABNORMAL MG/DL
BLOOD, URINE: NEGATIVE
CLARITY: CLEAR
COLOR: YELLOW
GLUCOSE URINE: NEGATIVE MG/DL
KETONES, URINE: NEGATIVE MG/DL
LEUKOCYTE ESTERASE, URINE: ABNORMAL
NITRITE, URINE: NEGATIVE
PH UA: 7 (ref 5–8)
PROTEIN UA: ABNORMAL MG/DL
SPECIFIC GRAVITY UA: 1.02 (ref 1–1.03)
UROBILINOGEN, URINE: >=8 E.U./DL

## 2021-06-08 PROCEDURE — 99213 OFFICE O/P EST LOW 20 MIN: CPT | Performed by: OBSTETRICS & GYNECOLOGY

## 2021-06-08 PROCEDURE — 81003 URINALYSIS AUTO W/O SCOPE: CPT

## 2021-06-08 NOTE — PROGRESS NOTES
Pt c/o pruritus especially at night, including  palms and soles. Will check bile salts/LFT. VS reviewed.

## 2021-06-08 NOTE — PROGRESS NOTES
Complains of itching on arms, chest, feet for one week getting worse. Denies any ctxs, bleeding or leaking of fluid. Meeting zak today.

## 2021-06-09 ENCOUNTER — HOSPITAL ENCOUNTER (OUTPATIENT)
Age: 31
Discharge: HOME | End: 2021-06-09
Payer: COMMERCIAL

## 2021-06-09 LAB
ALBUMIN SERPL-MCNC: 3.3 G/DL (ref 3.4–5)
ALP BLD-CCNC: 324 U/L (ref 40–129)
ALT SERPL-CCNC: 19 U/L (ref 10–40)
AST SERPL-CCNC: 28 U/L (ref 15–37)
BILIRUB SERPL-MCNC: 0.4 MG/DL (ref 0–1)
BILIRUBIN DIRECT: <0.2 MG/DL (ref 0–0.3)
BILIRUBIN, INDIRECT: ABNORMAL MG/DL (ref 0–1)
TOTAL PROTEIN: 6 G/DL (ref 6.4–8.2)

## 2021-06-09 PROCEDURE — 36415 COLL VENOUS BLD VENIPUNCTURE: CPT

## 2021-06-09 PROCEDURE — 82239 BILE ACIDS TOTAL: CPT

## 2021-06-09 PROCEDURE — 80076 HEPATIC FUNCTION PANEL: CPT

## 2021-06-11 LAB — BILE ACIDS TOTAL: 13 UMOL/L (ref 0–10)

## 2021-06-16 PROBLEM — O26.613 CHOLESTASIS DURING PREGNANCY IN THIRD TRIMESTER: Status: ACTIVE | Noted: 2021-06-16

## 2021-06-16 PROBLEM — K83.1 CHOLESTASIS DURING PREGNANCY IN THIRD TRIMESTER: Status: ACTIVE | Noted: 2021-06-16

## 2021-06-17 ENCOUNTER — ROUTINE PRENATAL (OUTPATIENT)
Dept: OBGYN | Age: 31
End: 2021-06-17
Payer: COMMERCIAL

## 2021-06-17 ENCOUNTER — HOSPITAL ENCOUNTER (OUTPATIENT)
Age: 31
Discharge: HOME | End: 2021-06-17
Attending: OBSTETRICS & GYNECOLOGY | Admitting: OBSTETRICS & GYNECOLOGY
Payer: COMMERCIAL

## 2021-06-17 VITALS
SYSTOLIC BLOOD PRESSURE: 92 MMHG | BODY MASS INDEX: 25.78 KG/M2 | DIASTOLIC BLOOD PRESSURE: 57 MMHG | WEIGHT: 132 LBS | HEART RATE: 77 BPM

## 2021-06-17 VITALS — SYSTOLIC BLOOD PRESSURE: 94 MMHG | HEART RATE: 92 BPM | TEMPERATURE: 96.8 F | DIASTOLIC BLOOD PRESSURE: 60 MMHG

## 2021-06-17 DIAGNOSIS — Z34.93 PRENATAL CARE IN THIRD TRIMESTER: ICD-10-CM

## 2021-06-17 DIAGNOSIS — Z34.83 PRENATAL CARE, SUBSEQUENT PREGNANCY, THIRD TRIMESTER: Primary | ICD-10-CM

## 2021-06-17 LAB
BILIRUBIN URINE: NEGATIVE MG/DL
BLOOD, URINE: NEGATIVE
CLARITY: CLEAR
COLOR: YELLOW
GLUCOSE URINE: NEGATIVE MG/DL
KETONES, URINE: NEGATIVE MG/DL
LEUKOCYTE ESTERASE, URINE: ABNORMAL
NITRITE, URINE: NEGATIVE
PH UA: 6.5 (ref 5–8)
PROTEIN UA: NEGATIVE MG/DL
SPECIFIC GRAVITY UA: 1.01 (ref 1–1.03)
UROBILINOGEN, URINE: 1 E.U./DL

## 2021-06-17 PROCEDURE — 99212 OFFICE O/P EST SF 10 MIN: CPT | Performed by: OBSTETRICS & GYNECOLOGY

## 2021-06-17 PROCEDURE — 59025 FETAL NON-STRESS TEST: CPT

## 2021-06-17 PROCEDURE — 87081 CULTURE SCREEN ONLY: CPT

## 2021-06-17 PROCEDURE — 81003 URINALYSIS AUTO W/O SCOPE: CPT

## 2021-06-17 PROCEDURE — 99211 OFF/OP EST MAY X REQ PHY/QHP: CPT

## 2021-06-17 RX ORDER — FLUTICASONE PROPIONATE 50 MCG
50 SPRAY, SUSPENSION (ML) NASAL PRN
COMMUNITY
Start: 2021-06-08

## 2021-06-17 NOTE — PROGRESS NOTES
Pt with continued c/o today of itching in hands( notices itching gets worse if she eats meat, milk, fats, and eggs) mostly at night; states baby is active and meeting daily kick counts. Patient denies vaginal bleeding and leaking of fluid/ctx.   NST today  GBS today

## 2021-06-17 NOTE — PROGRESS NOTES
Social Service Note:  Met with patient to complete the depression scale and give a packet of information on  SIDs,  and Car Seat Safety. Patient scored a 0  on depression scale and is not showing symptoms or signs of depression.   Pt has a crib and car seat    [employer] BSW, LSW

## 2021-06-17 NOTE — FLOWSHEET NOTE
Pt discharged undelivered after having reassuring NST. She reports feeling fetal movement.  She is discharged to [facility] clinic for appointment

## 2021-06-20 LAB — GROUP B STREP CULTURE: NORMAL

## 2021-06-21 ENCOUNTER — HOSPITAL ENCOUNTER (OUTPATIENT)
Age: 31
Discharge: HOME | End: 2021-06-21
Attending: OBSTETRICS & GYNECOLOGY | Admitting: OBSTETRICS & GYNECOLOGY
Payer: COMMERCIAL

## 2021-06-21 VITALS
TEMPERATURE: 98.1 F | HEART RATE: 69 BPM | RESPIRATION RATE: 20 BRPM | DIASTOLIC BLOOD PRESSURE: 53 MMHG | SYSTOLIC BLOOD PRESSURE: 90 MMHG

## 2021-06-21 PROCEDURE — 59025 FETAL NON-STRESS TEST: CPT

## 2021-06-21 NOTE — FLOWSHEET NOTE
Pt discharged to home undelivered after reactive nst read per Dr. Kelsy Marcos. Pt. Has an appointment with the clinic this Thursday and states will be there. Pt states active fetal movement today. Denies any contractions or leaking of fluid or vaginal bleeding. States her itching is very minimal at this time. Labor precautions reviewed with pt and pt states she will call or come to triage with increased itching, contractions decreased movement or vaginal leaking or bleeding. All interaction performed with [facility] video  Megha Sandoval #[id number].

## 2021-06-21 NOTE — PROCEDURES
FETAL SURVEILLANCE TESTING SUMMARY    INDICATIONS:  Fetal wellbeing    OBJECTIVE RESULTS:  Fetal heart variability: moderate  Fetal Heart Rate decelerations: none  Fetal Heart Rate accelerations: yes  Baseline FHR: 130s per minute  Fetal Non-stress Test: reactive    Fetal surveillance: reassuring

## 2021-06-24 ENCOUNTER — HOSPITAL ENCOUNTER (OUTPATIENT)
Age: 31
Discharge: HOME | DRG: 560 | End: 2021-06-24
Attending: OBSTETRICS & GYNECOLOGY | Admitting: OBSTETRICS & GYNECOLOGY
Payer: COMMERCIAL

## 2021-06-24 ENCOUNTER — ROUTINE PRENATAL (OUTPATIENT)
Dept: OBGYN | Age: 31
DRG: 560 | End: 2021-06-24
Payer: COMMERCIAL

## 2021-06-24 VITALS
RESPIRATION RATE: 16 BRPM | TEMPERATURE: 98.2 F | HEART RATE: 65 BPM | SYSTOLIC BLOOD PRESSURE: 103 MMHG | DIASTOLIC BLOOD PRESSURE: 55 MMHG

## 2021-06-24 VITALS
DIASTOLIC BLOOD PRESSURE: 65 MMHG | SYSTOLIC BLOOD PRESSURE: 105 MMHG | HEART RATE: 76 BPM | WEIGHT: 133 LBS | BODY MASS INDEX: 25.97 KG/M2

## 2021-06-24 DIAGNOSIS — Z34.93 PRENATAL CARE IN THIRD TRIMESTER: Primary | ICD-10-CM

## 2021-06-24 LAB
BILIRUBIN URINE: NEGATIVE MG/DL
BLOOD, URINE: NEGATIVE
CLARITY: CLEAR
COLOR: YELLOW
GLUCOSE URINE: NEGATIVE MG/DL
KETONES, URINE: NEGATIVE MG/DL
LEUKOCYTE ESTERASE, URINE: NEGATIVE
NITRITE, URINE: NEGATIVE
PH UA: 6.5 (ref 5–8)
PROTEIN UA: NEGATIVE MG/DL
SPECIFIC GRAVITY UA: 1.01 (ref 1–1.03)
UROBILINOGEN, URINE: 1 E.U./DL

## 2021-06-24 PROCEDURE — 99212 OFFICE O/P EST SF 10 MIN: CPT | Performed by: OBSTETRICS & GYNECOLOGY

## 2021-06-24 PROCEDURE — 59025 FETAL NON-STRESS TEST: CPT

## 2021-06-24 PROCEDURE — 81003 URINALYSIS AUTO W/O SCOPE: CPT

## 2021-06-24 NOTE — PROCEDURES
FETAL SURVEILLANCE TESTING SUMMARY    INDICATIONS:  cholestasis    OBJECTIVE RESULTS:  Fetal heart variability: moderate    Fetal surveillance: reassuring

## 2021-06-24 NOTE — FLOWSHEET NOTE
Pt presented to triage D for NST for cholestasis. To be induced tomorrow at 5 pm. Dr. Jan Trammell reviewed NST and orders received to discharge home. Pt voices understanding to come back tomorrow at 5 pm and to continue to do kick counts. Discharged home in stable condition.

## 2021-06-24 NOTE — PROGRESS NOTES
Meeting zak, denies any bleeding or leaking of fluid. Denies any itching, states she believe it was dietary related. She reports she has stopped eating meat & more of vegetables/fruits.

## 2021-06-25 ENCOUNTER — HOSPITAL ENCOUNTER (INPATIENT)
Age: 31
LOS: 2 days | Discharge: HOME | DRG: 560 | End: 2021-06-27
Attending: OBSTETRICS & GYNECOLOGY | Admitting: OBSTETRICS & GYNECOLOGY
Payer: COMMERCIAL

## 2021-06-25 PROBLEM — Z37.9 NORMAL LABOR: Status: ACTIVE | Noted: 2021-06-25

## 2021-06-25 LAB
ABO/RH: NORMAL
AMPHETAMINE SCREEN, URINE: NORMAL
ANTIBODY SCREEN: NORMAL
BARBITURATE SCREEN URINE: NORMAL
BENZODIAZEPINE SCREEN, URINE: NORMAL
BUPRENORPHINE URINE: NORMAL
CANNABINOID SCREEN URINE: NORMAL
COCAINE METABOLITE SCREEN URINE: NORMAL
HCT VFR BLD CALC: 37 % (ref 36–48)
HEMOGLOBIN: 11.9 G/DL (ref 12–16)
Lab: NORMAL
MCH RBC QN AUTO: 30.1 PG (ref 26–34)
MCHC RBC AUTO-ENTMCNC: 32.3 G/DL (ref 31–36)
MCV RBC AUTO: 93.5 FL (ref 80–100)
METHADONE SCREEN, URINE: NORMAL
OPIATE SCREEN URINE: NORMAL
OXYCODONE URINE: NORMAL
PDW BLD-RTO: 13.8 % (ref 12.4–15.4)
PH UA: 7
PHENCYCLIDINE SCREEN URINE: NORMAL
PLATELET # BLD: 198 K/UL (ref 135–450)
PMV BLD AUTO: 10 FL (ref 5–10.5)
PROPOXYPHENE SCREEN: NORMAL
RBC # BLD: 3.95 M/UL (ref 4–5.2)
WBC # BLD: 3 K/UL (ref 4–11)

## 2021-06-25 PROCEDURE — 6360000002 HC RX W HCPCS: Performed by: OBSTETRICS & GYNECOLOGY

## 2021-06-25 PROCEDURE — 85027 COMPLETE CBC AUTOMATED: CPT

## 2021-06-25 PROCEDURE — 86901 BLOOD TYPING SEROLOGIC RH(D): CPT

## 2021-06-25 PROCEDURE — 86780 TREPONEMA PALLIDUM: CPT

## 2021-06-25 PROCEDURE — 86850 RBC ANTIBODY SCREEN: CPT

## 2021-06-25 PROCEDURE — 86900 BLOOD TYPING SEROLOGIC ABO: CPT

## 2021-06-25 PROCEDURE — 1220000000 HC SEMI PRIVATE OB R&B

## 2021-06-25 PROCEDURE — 2580000003 HC RX 258: Performed by: OBSTETRICS & GYNECOLOGY

## 2021-06-25 PROCEDURE — 80307 DRUG TEST PRSMV CHEM ANLYZR: CPT

## 2021-06-25 RX ORDER — ONDANSETRON 2 MG/ML
4 INJECTION INTRAMUSCULAR; INTRAVENOUS EVERY 6 HOURS PRN
Status: DISCONTINUED | OUTPATIENT
Start: 2021-06-25 | End: 2021-06-26

## 2021-06-25 RX ORDER — SODIUM CHLORIDE, SODIUM LACTATE, POTASSIUM CHLORIDE, CALCIUM CHLORIDE 600; 310; 30; 20 MG/100ML; MG/100ML; MG/100ML; MG/100ML
1000 INJECTION, SOLUTION INTRAVENOUS PRN
Status: DISCONTINUED | OUTPATIENT
Start: 2021-06-25 | End: 2021-06-26

## 2021-06-25 RX ORDER — SODIUM CHLORIDE 9 MG/ML
25 INJECTION, SOLUTION INTRAVENOUS PRN
Status: DISCONTINUED | OUTPATIENT
Start: 2021-06-25 | End: 2021-06-26

## 2021-06-25 RX ORDER — SODIUM CHLORIDE 0.9 % (FLUSH) 0.9 %
5-40 SYRINGE (ML) INJECTION PRN
Status: DISCONTINUED | OUTPATIENT
Start: 2021-06-25 | End: 2021-06-26

## 2021-06-25 RX ORDER — DOCUSATE SODIUM 100 MG/1
100 CAPSULE, LIQUID FILLED ORAL 2 TIMES DAILY
Status: DISCONTINUED | OUTPATIENT
Start: 2021-06-25 | End: 2021-06-26

## 2021-06-25 RX ORDER — SODIUM CHLORIDE, SODIUM LACTATE, POTASSIUM CHLORIDE, CALCIUM CHLORIDE 600; 310; 30; 20 MG/100ML; MG/100ML; MG/100ML; MG/100ML
500 INJECTION, SOLUTION INTRAVENOUS PRN
Status: DISCONTINUED | OUTPATIENT
Start: 2021-06-25 | End: 2021-06-26

## 2021-06-25 RX ORDER — SODIUM CHLORIDE, SODIUM LACTATE, POTASSIUM CHLORIDE, CALCIUM CHLORIDE 600; 310; 30; 20 MG/100ML; MG/100ML; MG/100ML; MG/100ML
INJECTION, SOLUTION INTRAVENOUS CONTINUOUS
Status: DISCONTINUED | OUTPATIENT
Start: 2021-06-25 | End: 2021-06-26

## 2021-06-25 RX ORDER — SODIUM CHLORIDE 0.9 % (FLUSH) 0.9 %
5-40 SYRINGE (ML) INJECTION EVERY 12 HOURS SCHEDULED
Status: DISCONTINUED | OUTPATIENT
Start: 2021-06-25 | End: 2021-06-26

## 2021-06-25 RX ADMIN — SODIUM CHLORIDE, POTASSIUM CHLORIDE, SODIUM LACTATE AND CALCIUM CHLORIDE: 600; 310; 30; 20 INJECTION, SOLUTION INTRAVENOUS at 19:39

## 2021-06-25 RX ADMIN — Medication 1 MILLI-UNITS/MIN: at 19:59

## 2021-06-25 RX ADMIN — SODIUM CHLORIDE, POTASSIUM CHLORIDE, SODIUM LACTATE AND CALCIUM CHLORIDE: 600; 310; 30; 20 INJECTION, SOLUTION INTRAVENOUS at 23:03

## 2021-06-25 NOTE — H&P
Department of Obstetrics and Gynecology   Obstetrics History and Physical        CHIEF COMPLAINT:  Here for IOL    HISTORY OF PRESENT ILLNESS:      The patient is a 31 y.o. female at 37w0d. OB History        3    Para   1    Term   1            AB   1    Living   1       SAB   1    TAB        Ectopic        Molar        Multiple        Live Births   1            Patient presents with a chief complaint as above and is being admitted for induction    Estimated Due Date: Estimated Date of Delivery: 21    PRENATAL CARE:    Complicated by: Cholestasis of the pregnancy    PAST OB HISTORY:  OB History        3    Para   1    Term   1            AB   1    Living   1       SAB   1    TAB        Ectopic        Molar        Multiple        Live Births   1                Past Medical History:        Diagnosis Date    Sinusitis      Past Surgical History:    No past surgical history on file. Allergies:  Patient has no known allergies. Social History:    Social History     Socioeconomic History    Marital status: Single     Spouse name: Not on file    Number of children: Not on file    Years of education: Not on file    Highest education level: Not on file   Occupational History    Not on file   Tobacco Use    Smoking status: Never Smoker    Smokeless tobacco: Never Used   Vaping Use    Vaping Use: Never used   Substance and Sexual Activity    Alcohol use: Not Currently    Drug use: Not Currently    Sexual activity: Yes     Partners: Male   Other Topics Concern    Not on file   Social History Narrative    Not on file     Social Determinants of Health     Financial Resource Strain:     Difficulty of Paying Living Expenses:    Food Insecurity:     Worried About Running Out of Food in the Last Year:     [address] in the Last Year:    Transportation Needs:     Lack of Transportation (Medical):      Lack of Transportation (Non-Medical):    Physical Activity:     Days of Exercise per Week:     Minutes of Exercise per Session:    Stress:     Feeling of Stress :    Social Connections:     Frequency of Communication with Friends and Family:     Frequency of Social Gatherings with Friends and Family:     Attends Gn[employer] Services:     Active Member of Clubs or Organizations:     Attends Club or Organization Meetings:     Marital Status:    Intimate Partner Violence:     Fear of Current or Ex-Partner:     Emotionally Abused:     Physically Abused:     Sexually Abused:      Family History:       Problem Relation Age of Onset    Cancer Mother      Medications Prior to Admission:  Medications Prior to Admission: fluticasone (FLONASE) 50 MCG/ACT nasal spray, 50 sprays by Nasal route as needed  Prenatal Vit-Fe Fumarate-FA (PRENATAL VITAMIN PLUS LOW IRON) 27-1 MG TABS, Take 1 tablet by mouth daily    REVIEW OF SYSTEMS:        PHYSICAL EXAM:  There were no vitals filed for this visit. General appearance:  awake, alert, cooperative, no apparent distress, and appears stated age  Neurologic:  Awake, alert, oriented to name, place and time. Lungs:  No increased work of breathing, good air exchange  Abdomen:  Soft, non tender, gravid, consistent with her gestational age, EFW by Leopold's maneuver was 3200 gms   Fetal heart rate:  Reassuring.   Pelvis:  Adequate pelvis  Cervix: 2/50  Contraction frequency:      Membranes:  Intact    Labs:   CBC:   Lab Results   Component Value Date    WBC 5.2 04/22/2021    RBC 3.46 04/22/2021    HGB 11.4 04/22/2021    HCT 33.8 04/22/2021    MCV 97.4 04/22/2021    MCH 32.8 04/22/2021    MCHC 33.7 04/22/2021    RDW 13.7 04/22/2021     04/22/2021    MPV 9.5 04/22/2021       ASSESSMENT AND PLAN:    IOL for cholestasis: Admit, start on Pitocin, anticipate normal delivery, routine labor orders  Fetus: Reassuring  GBS: No  Other: desires NCB

## 2021-06-26 LAB
HCT VFR BLD CALC: 39.5 % (ref 36–48)
HEMOGLOBIN: 13.2 G/DL (ref 12–16)
MCH RBC QN AUTO: 30.8 PG (ref 26–34)
MCHC RBC AUTO-ENTMCNC: 33.4 G/DL (ref 31–36)
MCV RBC AUTO: 92.3 FL (ref 80–100)
PDW BLD-RTO: 13.6 % (ref 12.4–15.4)
PLATELET # BLD: 182 K/UL (ref 135–450)
PMV BLD AUTO: 10 FL (ref 5–10.5)
RBC # BLD: 4.28 M/UL (ref 4–5.2)
TOTAL SYPHILLIS IGG/IGM: NORMAL
WBC # BLD: 9.6 K/UL (ref 4–11)

## 2021-06-26 PROCEDURE — 3E033VJ INTRODUCTION OF OTHER HORMONE INTO PERIPHERAL VEIN, PERCUTANEOUS APPROACH: ICD-10-PCS | Performed by: OBSTETRICS & GYNECOLOGY

## 2021-06-26 PROCEDURE — 85027 COMPLETE CBC AUTOMATED: CPT

## 2021-06-26 PROCEDURE — 1220000000 HC SEMI PRIVATE OB R&B

## 2021-06-26 PROCEDURE — 6360000002 HC RX W HCPCS: Performed by: OBSTETRICS & GYNECOLOGY

## 2021-06-26 PROCEDURE — 6360000002 HC RX W HCPCS

## 2021-06-26 PROCEDURE — 6370000000 HC RX 637 (ALT 250 FOR IP): Performed by: OBSTETRICS & GYNECOLOGY

## 2021-06-26 PROCEDURE — 2580000003 HC RX 258: Performed by: OBSTETRICS & GYNECOLOGY

## 2021-06-26 PROCEDURE — 10907ZC DRAINAGE OF AMNIOTIC FLUID, THERAPEUTIC FROM PRODUCTS OF CONCEPTION, VIA NATURAL OR ARTIFICIAL OPENING: ICD-10-PCS | Performed by: OBSTETRICS & GYNECOLOGY

## 2021-06-26 PROCEDURE — 7200000001 HC VAGINAL DELIVERY

## 2021-06-26 RX ORDER — HYDROCODONE BITARTRATE AND ACETAMINOPHEN 5; 325 MG/1; MG/1
2 TABLET ORAL EVERY 4 HOURS PRN
Status: DISCONTINUED | OUTPATIENT
Start: 2021-06-26 | End: 2021-06-27 | Stop reason: HOSPADM

## 2021-06-26 RX ORDER — SODIUM CHLORIDE 0.9 % (FLUSH) 0.9 %
5-40 SYRINGE (ML) INJECTION PRN
Status: DISCONTINUED | OUTPATIENT
Start: 2021-06-26 | End: 2021-06-27 | Stop reason: HOSPADM

## 2021-06-26 RX ORDER — SODIUM CHLORIDE 9 MG/ML
25 INJECTION, SOLUTION INTRAVENOUS PRN
Status: DISCONTINUED | OUTPATIENT
Start: 2021-06-26 | End: 2021-06-27 | Stop reason: HOSPADM

## 2021-06-26 RX ORDER — SODIUM CHLORIDE 0.9 % (FLUSH) 0.9 %
5-40 SYRINGE (ML) INJECTION EVERY 12 HOURS SCHEDULED
Status: DISCONTINUED | OUTPATIENT
Start: 2021-06-26 | End: 2021-06-27 | Stop reason: HOSPADM

## 2021-06-26 RX ORDER — LIDOCAINE HYDROCHLORIDE 10 MG/ML
INJECTION, SOLUTION EPIDURAL; INFILTRATION; INTRACAUDAL; PERINEURAL
Status: DISCONTINUED
Start: 2021-06-26 | End: 2021-06-26 | Stop reason: WASHOUT

## 2021-06-26 RX ORDER — HYDROCODONE BITARTRATE AND ACETAMINOPHEN 5; 325 MG/1; MG/1
1 TABLET ORAL EVERY 4 HOURS PRN
Status: DISCONTINUED | OUTPATIENT
Start: 2021-06-26 | End: 2021-06-27 | Stop reason: HOSPADM

## 2021-06-26 RX ORDER — METHYLERGONOVINE MALEATE 0.2 MG/ML
INJECTION INTRAVENOUS
Status: COMPLETED
Start: 2021-06-26 | End: 2021-06-26

## 2021-06-26 RX ORDER — DOCUSATE SODIUM 100 MG/1
100 CAPSULE, LIQUID FILLED ORAL 2 TIMES DAILY
Status: DISCONTINUED | OUTPATIENT
Start: 2021-06-26 | End: 2021-06-27 | Stop reason: HOSPADM

## 2021-06-26 RX ORDER — IBUPROFEN 800 MG/1
800 TABLET ORAL EVERY 8 HOURS
Status: DISCONTINUED | OUTPATIENT
Start: 2021-06-26 | End: 2021-06-27 | Stop reason: HOSPADM

## 2021-06-26 RX ORDER — ACETAMINOPHEN 325 MG/1
650 TABLET ORAL EVERY 4 HOURS PRN
Status: DISCONTINUED | OUTPATIENT
Start: 2021-06-26 | End: 2021-06-27 | Stop reason: HOSPADM

## 2021-06-26 RX ORDER — IBUPROFEN 600 MG/1
600 TABLET ORAL EVERY 6 HOURS PRN
Qty: 30 TABLET | Refills: 1 | Status: SHIPPED | OUTPATIENT
Start: 2021-06-26

## 2021-06-26 RX ORDER — MODIFIED LANOLIN
OINTMENT (GRAM) TOPICAL PRN
Status: DISCONTINUED | OUTPATIENT
Start: 2021-06-26 | End: 2021-06-27 | Stop reason: HOSPADM

## 2021-06-26 RX ADMIN — Medication 166.7 ML: at 02:57

## 2021-06-26 RX ADMIN — DOCUSATE SODIUM 100 MG: 100 CAPSULE, LIQUID FILLED ORAL at 20:42

## 2021-06-26 RX ADMIN — DOCUSATE SODIUM 100 MG: 100 CAPSULE, LIQUID FILLED ORAL at 09:59

## 2021-06-26 RX ADMIN — METHYLERGONOVINE MALEATE: 0.2 INJECTION INTRAVENOUS at 03:04

## 2021-06-26 RX ADMIN — Medication 10 ML: at 09:59

## 2021-06-26 RX ADMIN — IBUPROFEN 800 MG: 800 TABLET, FILM COATED ORAL at 12:56

## 2021-06-26 RX ADMIN — ACETAMINOPHEN 650 MG: 325 TABLET ORAL at 17:06

## 2021-06-26 RX ADMIN — ACETAMINOPHEN 650 MG: 325 TABLET ORAL at 10:03

## 2021-06-26 RX ADMIN — IBUPROFEN 800 MG: 800 TABLET, FILM COATED ORAL at 20:42

## 2021-06-26 RX ADMIN — IBUPROFEN 800 MG: 800 TABLET, FILM COATED ORAL at 05:15

## 2021-06-26 ASSESSMENT — PAIN SCALES - GENERAL
PAINLEVEL_OUTOF10: 2
PAINLEVEL_OUTOF10: 3
PAINLEVEL_OUTOF10: 0
PAINLEVEL_OUTOF10: 2
PAINLEVEL_OUTOF10: 0

## 2021-06-26 NOTE — DISCHARGE SUMMARY
Obstetrical Discharge Form    Gestational Age: 37w1d    Antepartum complications: cholestasis of the pregnancy    Date of Delivery: 21      Type of Delivery: vaginal, spontaneous    Delivered By: Mariano Metcalf     Baby:      Information for the patient's :  Mable Brenner [[id number]]   APGAR One: 9     Information for the patient's :  Mable Brenner [[id number]]   APGAR Five: 9     Information for the patient's :  Mable Pineda [[id number]]   Birth Weight: 6 lb 2.6 oz (2.795 kg)       Anesthesia: None    Intrapartum complications: None    Postpartum complications: right leg varicose veins    Discharge Medication:    [facility]   Home Medication Instructions BMD:[id number]    Printed on:21 1050   Medication Information                      fluticasone (FLONASE) 50 MCG/ACT nasal spray  50 sprays by Nasal route as needed             ibuprofen (ADVIL;MOTRIN) 600 MG tablet  Take 1 tablet by mouth every 6 hours as needed for Pain             Prenatal Vit-Fe Fumarate-FA (PRENATAL VITAMIN PLUS LOW IRON) 27-1 MG TABS  Take 1 tablet by mouth daily                  Discharge Condition:  good    Discharge Date: 21    PLAN:  Follow up in 6 weeks for routine PP visit  All questions answered  D/C summary begun at delivery for D/C planning purposes, any delay in discharge from ordered D/C date due to  factors.

## 2021-06-26 NOTE — PROGRESS NOTES
[facility] post-partum note    Post-partum Day #1    Subjective:  Pain is : None  Lochia: thin lochia  Nausea: None  Ambulating, tolerate regular diet  Objective:  BP (!) 89/50 Comment: pt asymptomatic  Pulse 57   Temp 99 °F (37.2 °C)   Resp 16   Ht 5' 2\" (1.575 m)   Wt 133 lb (60.3 kg)   LMP 08/17/2020 (Exact Date)   Breastfeeding Unknown   BMI 24.33 kg/m²   Abdominal exam: soft, nontender, nondistended,fundus below umbilicus, firm.     Lab Results   Component Value Date    WBC 9.6 06/26/2021    HGB 13.2 06/26/2021    HCT 39.5 06/26/2021    MCV 92.3 06/26/2021     06/26/2021        Assessment/Plan:      Continue Postpartum care, encourage ambulation  Discharge today: no  Follow up: 6 weeks

## 2021-06-27 VITALS
HEART RATE: 60 BPM | WEIGHT: 133 LBS | HEIGHT: 62 IN | SYSTOLIC BLOOD PRESSURE: 96 MMHG | OXYGEN SATURATION: 100 % | DIASTOLIC BLOOD PRESSURE: 50 MMHG | RESPIRATION RATE: 16 BRPM | BODY MASS INDEX: 24.48 KG/M2 | TEMPERATURE: 97.7 F

## 2021-06-27 PROCEDURE — 6370000000 HC RX 637 (ALT 250 FOR IP): Performed by: OBSTETRICS & GYNECOLOGY

## 2021-06-27 RX ADMIN — IBUPROFEN 800 MG: 800 TABLET, FILM COATED ORAL at 04:40

## 2021-06-27 ASSESSMENT — PAIN SCALES - GENERAL: PAINLEVEL_OUTOF10: 5

## 2021-07-27 ENCOUNTER — TELEPHONE (OUTPATIENT)
Dept: OBGYN | Age: 31
End: 2021-07-27

## 2021-08-06 ENCOUNTER — POSTPARTUM VISIT (OUTPATIENT)
Dept: OBGYN | Age: 31
End: 2021-08-06
Payer: COMMERCIAL

## 2021-08-06 VITALS — SYSTOLIC BLOOD PRESSURE: 120 MMHG | BODY MASS INDEX: 22.86 KG/M2 | WEIGHT: 125 LBS | DIASTOLIC BLOOD PRESSURE: 69 MMHG

## 2021-08-06 PROCEDURE — 99214 OFFICE O/P EST MOD 30 MIN: CPT | Performed by: OBSTETRICS & GYNECOLOGY

## 2021-08-06 RX ORDER — ACETAMINOPHEN AND CODEINE PHOSPHATE 120; 12 MG/5ML; MG/5ML
1 SOLUTION ORAL DAILY
Qty: 28 TABLET | Refills: 5 | Status: SHIPPED | OUTPATIENT
Start: 2021-08-06 | End: 2021-09-03

## 2021-08-06 NOTE — PROGRESS NOTES
[facility]  Postpartum Visit  Patient Name:  Torey Borrego  YOB: 1990      HPI     The patient is a 31 y.o. female   OB History        3    Para   2    Term   2            AB   1    Living   2       SAB   1    TAB        Ectopic        Molar        Multiple   0    Live Births   2             who presents for her 6 weeks postpartum visit. Her pregnancy was uncomplicated. Problem list: Language  She has no unusual complaints. Delivery:     Procedure:  Spontaneous vaginal delivery    Surgeon: Dr. Sidney Eckert      OB History    Para Term  AB Living   3 2 2 0 1 2   SAB TAB Ectopic Molar Multiple Live Births   1 0 0 0 0 2      # Outcome Date GA Lbr Sushant/2nd Weight Sex Delivery Anes PTL Lv   3 Term 21 37w1d / 00:13 6 lb 2.6 oz (2.795 kg) F Vag-Spont None N LAZARA      Birth Comments: 245 hug      Name: Dmitri Carp: 9  Apgar5: 9   2 Term 03/10/11 40w0d  8 lb (3.629 kg) F Vag-Spont None  LAZARA   1 SAB                Anesthesia:  none    Delivery complications:  None    She is currently both breast and bottle    Her vaginal bleeding is none now. She has had a period: No:     She desires contraception, desires BCP's today but list of clinics given for IUD    Last Pap: 21 normal    ROS     Gastrointestinal: negative  Genitourinary:negative  Behavioral/Psych: negative      EXAM     Vital Signs:  LMP 2020 (Exact Date)      General: Mood is well. Lungs:  CTA bilat    Heart:  RRR without murmur    Breast: No masses, no erythema    Abdomen: soft, nontender, no masses    Pelvic: Vulva WNL, vagina WNL, cervix WNL, uterus WNL, perineum WNL      ASSESSMENT     1. Routine Postpartum visit  2.  Contraception counseling      PLAN     Discharge from [address] Becerra MD  2021